# Patient Record
Sex: FEMALE | Race: WHITE | NOT HISPANIC OR LATINO | Employment: FULL TIME | URBAN - METROPOLITAN AREA
[De-identification: names, ages, dates, MRNs, and addresses within clinical notes are randomized per-mention and may not be internally consistent; named-entity substitution may affect disease eponyms.]

---

## 2017-10-10 ENCOUNTER — HISTORICAL (OUTPATIENT)
Dept: ADMINISTRATIVE | Facility: HOSPITAL | Age: 77
End: 2017-10-10

## 2018-05-21 ENCOUNTER — HISTORICAL (OUTPATIENT)
Dept: ADMINISTRATIVE | Facility: HOSPITAL | Age: 78
End: 2018-05-21

## 2018-05-23 ENCOUNTER — HISTORICAL (OUTPATIENT)
Dept: ADMINISTRATIVE | Facility: HOSPITAL | Age: 78
End: 2018-05-23

## 2018-07-12 ENCOUNTER — HISTORICAL (OUTPATIENT)
Dept: ADMINISTRATIVE | Facility: HOSPITAL | Age: 78
End: 2018-07-12

## 2019-01-04 ENCOUNTER — HISTORICAL (OUTPATIENT)
Dept: ADMINISTRATIVE | Facility: HOSPITAL | Age: 79
End: 2019-01-04

## 2019-02-25 ENCOUNTER — HISTORICAL (OUTPATIENT)
Dept: ADMINISTRATIVE | Facility: HOSPITAL | Age: 79
End: 2019-02-25

## 2019-02-25 LAB
ALBUMIN SERPL-MCNC: 4 GM/DL (ref 3.4–5)
ALBUMIN/GLOB SERPL: 1.48 {RATIO} (ref 1.5–2.5)
ALP SERPL-CCNC: 71 UNIT/L (ref 38–126)
ALT SERPL-CCNC: 20 UNIT/L (ref 7–52)
AST SERPL-CCNC: 27 UNIT/L (ref 15–37)
BILIRUB SERPL-MCNC: 0.9 MG/DL (ref 0.2–1)
BILIRUBIN DIRECT+TOT PNL SERPL-MCNC: 0.2 MG/DL (ref 0–0.5)
BILIRUBIN DIRECT+TOT PNL SERPL-MCNC: 0.7 MG/DL
BUN SERPL-MCNC: 17 MG/DL (ref 7–18)
CALCIUM SERPL-MCNC: 8.8 MG/DL (ref 8.5–10)
CHLORIDE SERPL-SCNC: 100 MMOL/L (ref 98–107)
CO2 SERPL-SCNC: 27 MMOL/L (ref 21–32)
CREAT SERPL-MCNC: 1.13 MG/DL (ref 0.6–1.3)
GLOBULIN SER-MCNC: 2.7 GM/DL (ref 1.2–3)
GLUCOSE SERPL-MCNC: 122 MG/DL (ref 74–106)
POTASSIUM SERPL-SCNC: 4.1 MMOL/L (ref 3.5–5.1)
PROT SERPL-MCNC: 6.7 GM/DL (ref 6.4–8.2)
SODIUM SERPL-SCNC: 137 MMOL/L (ref 136–145)

## 2019-06-06 ENCOUNTER — HISTORICAL (OUTPATIENT)
Dept: ADMINISTRATIVE | Facility: HOSPITAL | Age: 79
End: 2019-06-06

## 2019-06-19 ENCOUNTER — HISTORICAL (OUTPATIENT)
Dept: ADMINISTRATIVE | Facility: HOSPITAL | Age: 79
End: 2019-06-19

## 2019-06-19 LAB
ABS NEUT (OLG): 7.8 X10(3)/MCL (ref 2.1–9.2)
ALBUMIN SERPL-MCNC: 3.7 GM/DL (ref 3.4–5)
ALBUMIN/GLOB SERPL: 1.54 {RATIO} (ref 1.5–2.5)
ALP SERPL-CCNC: 82 UNIT/L (ref 38–126)
ALT SERPL-CCNC: 14 UNIT/L (ref 7–52)
AST SERPL-CCNC: 14 UNIT/L (ref 15–37)
BILIRUB SERPL-MCNC: 1.2 MG/DL (ref 0.2–1)
BILIRUBIN DIRECT+TOT PNL SERPL-MCNC: 0.3 MG/DL (ref 0–0.5)
BILIRUBIN DIRECT+TOT PNL SERPL-MCNC: 0.9 MG/DL
BUN SERPL-MCNC: 19 MG/DL (ref 7–18)
CALCIUM SERPL-MCNC: 8.7 MG/DL (ref 8.5–10)
CHLORIDE SERPL-SCNC: 100 MMOL/L (ref 98–107)
CHOLEST SERPL-MCNC: 170 MG/DL (ref 0–200)
CHOLEST/HDLC SERPL: 3 {RATIO}
CO2 SERPL-SCNC: 27 MMOL/L (ref 21–32)
CREAT SERPL-MCNC: 1.08 MG/DL (ref 0.6–1.3)
CREAT UR-MCNC: 300 MG/DL
ERYTHROCYTE [DISTWIDTH] IN BLOOD BY AUTOMATED COUNT: 13.1 % (ref 11.5–17)
EST. AVERAGE GLUCOSE BLD GHB EST-MCNC: 146 MG/DL
GLOBULIN SER-MCNC: 2.4 GM/DL (ref 1.2–3)
GLUCOSE SERPL-MCNC: 141 MG/DL (ref 74–106)
HBA1C MFR BLD: 6.7 % (ref 4.4–6.4)
HCT VFR BLD AUTO: 41.6 % (ref 37–47)
HDLC SERPL-MCNC: 57 MG/DL (ref 35–60)
HGB BLD-MCNC: 13.8 GM/DL (ref 12–16)
LDLC SERPL CALC-MCNC: 100 MG/DL (ref 0–129)
LYMPHOCYTES # BLD AUTO: 3.1 X10(3)/MCL (ref 0.6–3.4)
LYMPHOCYTES NFR BLD AUTO: 24.8 % (ref 13–40)
MCH RBC QN AUTO: 29.3 PG (ref 27–31.2)
MCHC RBC AUTO-ENTMCNC: 33 GM/DL (ref 32–36)
MCV RBC AUTO: 88 FL (ref 80–94)
MICROALBUMIN UR-MCNC: 30 MG/L
MICROALBUMIN/CREAT RATIO PNL UR: <30 MG/GM
MONOCYTES # BLD AUTO: 1.5 X10(3)/MCL (ref 0.1–1.3)
MONOCYTES NFR BLD AUTO: 12.2 % (ref 0.1–24)
NEUTROPHILS NFR BLD AUTO: 63 % (ref 47–80)
PLATELET # BLD AUTO: 249 X10(3)/MCL (ref 130–400)
PMV BLD AUTO: 8.5 FL (ref 9.4–12.4)
POTASSIUM SERPL-SCNC: 3.4 MMOL/L (ref 3.5–5.1)
PROT SERPL-MCNC: 6.1 GM/DL (ref 6.4–8.2)
RBC # BLD AUTO: 4.71 X10(6)/MCL (ref 4.2–5.4)
SODIUM SERPL-SCNC: 137 MMOL/L (ref 136–145)
T3FREE SERPL-MCNC: 2.23 PG/ML (ref 1.45–3.48)
T4 FREE SERPL-MCNC: 1.14 NG/DL (ref 0.76–1.46)
TRIGL SERPL-MCNC: 120 MG/DL (ref 30–150)
TSH SERPL-ACNC: 1.68 MIU/ML (ref 0.35–4.94)
VLDLC SERPL CALC-MCNC: 24 MG/DL
WBC # SPEC AUTO: 12.4 X10(3)/MCL (ref 4.5–11.5)

## 2019-10-21 ENCOUNTER — HISTORICAL (OUTPATIENT)
Dept: ADMINISTRATIVE | Facility: HOSPITAL | Age: 79
End: 2019-10-21

## 2019-10-21 LAB
ABS NEUT (OLG): 7.6 X10(3)/MCL (ref 2.1–9.2)
ALBUMIN SERPL-MCNC: 4.2 GM/DL (ref 3.4–5)
ALBUMIN/GLOB SERPL: 1.45 {RATIO} (ref 1.5–2.5)
ALP SERPL-CCNC: 74 UNIT/L (ref 38–126)
ALT SERPL-CCNC: 16 UNIT/L (ref 7–52)
APPEARANCE, UA: CLEAR
AST SERPL-CCNC: 19 UNIT/L (ref 15–37)
BACTERIA #/AREA URNS AUTO: NORMAL /HPF
BILIRUB SERPL-MCNC: 0.8 MG/DL (ref 0.2–1)
BILIRUB UR QL STRIP: NEGATIVE MG/DL
BILIRUBIN DIRECT+TOT PNL SERPL-MCNC: 0.2 MG/DL (ref 0–0.5)
BILIRUBIN DIRECT+TOT PNL SERPL-MCNC: 0.6 MG/DL
BUN SERPL-MCNC: 18 MG/DL (ref 7–18)
CALCIUM SERPL-MCNC: 9.4 MG/DL (ref 8.5–10)
CHLORIDE SERPL-SCNC: 99 MMOL/L (ref 98–107)
CHOLEST SERPL-MCNC: 183 MG/DL (ref 0–200)
CHOLEST/HDLC SERPL: 3 {RATIO}
CO2 SERPL-SCNC: 29 MMOL/L (ref 21–32)
COLOR UR: NORMAL
CREAT SERPL-MCNC: 1.04 MG/DL (ref 0.6–1.3)
CREAT UR-MCNC: 50 MG/DL
ERYTHROCYTE [DISTWIDTH] IN BLOOD BY AUTOMATED COUNT: 13.3 % (ref 11.5–17)
EST. AVERAGE GLUCOSE BLD GHB EST-MCNC: 140 MG/DL
GLOBULIN SER-MCNC: 2.9 GM/DL (ref 1.2–3)
GLUCOSE (UA): NEGATIVE MG/DL
GLUCOSE SERPL-MCNC: 126 MG/DL (ref 74–106)
HBA1C MFR BLD: 6.5 % (ref 4.4–6.4)
HCT VFR BLD AUTO: 42.6 % (ref 37–47)
HDLC SERPL-MCNC: 62 MG/DL (ref 35–60)
HGB BLD-MCNC: 13.9 GM/DL (ref 12–16)
HGB UR QL STRIP: NEGATIVE UNIT/L
KETONES UR QL STRIP: NEGATIVE MG/DL
LDLC SERPL CALC-MCNC: 100 MG/DL (ref 0–129)
LEUKOCYTE ESTERASE UR QL STRIP: NEGATIVE UNIT/L
LYMPHOCYTES # BLD AUTO: 2.5 X10(3)/MCL (ref 0.6–3.4)
LYMPHOCYTES NFR BLD AUTO: 22.1 % (ref 13–40)
MCH RBC QN AUTO: 28.7 PG (ref 27–31.2)
MCHC RBC AUTO-ENTMCNC: 33 GM/DL (ref 32–36)
MCV RBC AUTO: 88 FL (ref 80–94)
MICROALBUMIN UR-MCNC: 10 MG/L
MICROALBUMIN/CREAT RATIO PNL UR: <30 MG/GM
MONOCYTES # BLD AUTO: 1.2 X10(3)/MCL (ref 0.1–1.3)
MONOCYTES NFR BLD AUTO: 10.9 % (ref 0.1–24)
NEUTROPHILS NFR BLD AUTO: 67 % (ref 47–80)
NITRITE UR QL STRIP.AUTO: NEGATIVE
PH UR STRIP: 5 [PH]
PLATELET # BLD AUTO: 275 X10(3)/MCL (ref 130–400)
PMV BLD AUTO: 8.9 FL (ref 9.4–12.4)
POTASSIUM SERPL-SCNC: 3.7 MMOL/L (ref 3.5–5.1)
PROT SERPL-MCNC: 7.1 GM/DL (ref 6.4–8.2)
PROT UR QL STRIP: NEGATIVE MG/DL
RBC # BLD AUTO: 4.85 X10(6)/MCL (ref 4.2–5.4)
RBC #/AREA URNS HPF: NORMAL /HPF
SODIUM SERPL-SCNC: 140 MMOL/L (ref 136–145)
SP GR UR STRIP: 1.01
SQUAMOUS EPITHELIAL, UA: NORMAL /LPF
T3FREE SERPL-MCNC: 1.47 PG/ML (ref 1.45–3.48)
T4 FREE SERPL-MCNC: 1.35 NG/DL (ref 0.76–1.46)
TRIGL SERPL-MCNC: 100 MG/DL (ref 30–150)
TSH SERPL-ACNC: 0.25 MIU/ML (ref 0.35–4.94)
UROBILINOGEN UR STRIP-ACNC: 0.2 MG/DL
VLDLC SERPL CALC-MCNC: 20 MG/DL
WBC # SPEC AUTO: 11.3 X10(3)/MCL (ref 4.5–11.5)
WBC #/AREA URNS AUTO: NORMAL /[HPF]

## 2020-01-15 ENCOUNTER — HISTORICAL (OUTPATIENT)
Dept: ADMINISTRATIVE | Facility: HOSPITAL | Age: 80
End: 2020-01-15

## 2020-11-03 ENCOUNTER — HISTORICAL (OUTPATIENT)
Dept: ADMINISTRATIVE | Facility: HOSPITAL | Age: 80
End: 2020-11-03

## 2020-11-03 LAB
ALBUMIN SERPL-MCNC: 3.8 GM/DL (ref 3.4–5)
ALBUMIN/GLOB SERPL: 1.52 {RATIO} (ref 1.5–2.5)
ALP SERPL-CCNC: 66 UNIT/L (ref 38–126)
ALT SERPL-CCNC: 20 UNIT/L (ref 7–52)
AST SERPL-CCNC: 24 UNIT/L (ref 15–37)
BILIRUB SERPL-MCNC: 1 MG/DL (ref 0.2–1)
BILIRUBIN DIRECT+TOT PNL SERPL-MCNC: 0.2 MG/DL (ref 0–0.5)
BILIRUBIN DIRECT+TOT PNL SERPL-MCNC: 0.8 MG/DL
BUN SERPL-MCNC: 26 MG/DL (ref 7–18)
CALCIUM SERPL-MCNC: 9.4 MG/DL (ref 8.5–10)
CHLORIDE SERPL-SCNC: 100 MMOL/L (ref 98–107)
CHOLEST SERPL-MCNC: 184 MG/DL (ref 0–200)
CHOLEST/HDLC SERPL: 3.3 {RATIO}
CO2 SERPL-SCNC: 26 MMOL/L (ref 21–32)
CREAT SERPL-MCNC: 1.14 MG/DL (ref 0.6–1.3)
CREAT UR-MCNC: 300 MG/DL
EST. AVERAGE GLUCOSE BLD GHB EST-MCNC: 146 MG/DL
GLOBULIN SER-MCNC: 2.5 GM/DL (ref 1.2–3)
GLUCOSE SERPL-MCNC: 125 MG/DL (ref 74–106)
HBA1C MFR BLD: 6.7 % (ref 4.4–6.4)
HDLC SERPL-MCNC: 55 MG/DL (ref 35–60)
LDLC SERPL CALC-MCNC: 113 MG/DL (ref 0–129)
MICROALBUMIN UR-MCNC: 80 MG/L
MICROALBUMIN/CREAT RATIO PNL UR: ABNORMAL MG/GM
POTASSIUM SERPL-SCNC: 4.5 MMOL/L (ref 3.5–5.1)
PROT SERPL-MCNC: 6.3 GM/DL (ref 6.4–8.2)
SODIUM SERPL-SCNC: 140 MMOL/L (ref 136–145)
TRIGL SERPL-MCNC: 155 MG/DL (ref 30–150)
VLDLC SERPL CALC-MCNC: 31 MG/DL

## 2020-11-09 ENCOUNTER — HISTORICAL (OUTPATIENT)
Dept: ADMINISTRATIVE | Facility: HOSPITAL | Age: 80
End: 2020-11-09

## 2020-11-09 LAB
DEPRECATED CALCIDIOL+CALCIFEROL SERPL-MC: 39.3 NG/ML (ref 30–80)
T3FREE SERPL-MCNC: 2.37 PG/ML (ref 1.45–3.48)
T4 FREE SERPL-MCNC: 1.01 NG/DL (ref 0.76–1.46)
TSH SERPL-ACNC: 1.21 MIU/ML (ref 0.35–4.94)

## 2021-05-11 ENCOUNTER — HISTORICAL (OUTPATIENT)
Dept: ADMINISTRATIVE | Facility: HOSPITAL | Age: 81
End: 2021-05-11

## 2021-05-11 LAB
ABS NEUT (OLG): 6.1 X10(3)/MCL (ref 2.1–9.2)
ALBUMIN SERPL-MCNC: 3.9 GM/DL (ref 3.4–5)
ALBUMIN/GLOB SERPL: 1.77 {RATIO} (ref 1.5–2.5)
ALP SERPL-CCNC: 56 UNIT/L (ref 38–126)
ALT SERPL-CCNC: 19 UNIT/L (ref 7–52)
AST SERPL-CCNC: 25 UNIT/L (ref 15–37)
BILIRUB SERPL-MCNC: 0.9 MG/DL (ref 0.2–1)
BILIRUBIN DIRECT+TOT PNL SERPL-MCNC: 0.2 MG/DL (ref 0–0.5)
BILIRUBIN DIRECT+TOT PNL SERPL-MCNC: 0.7 MG/DL
BUN SERPL-MCNC: 19 MG/DL (ref 7–18)
CALCIUM SERPL-MCNC: 9.7 MG/DL (ref 8.5–10)
CHLORIDE SERPL-SCNC: 98 MMOL/L (ref 98–107)
CHOLEST SERPL-MCNC: 186 MG/DL (ref 0–200)
CHOLEST/HDLC SERPL: 3.2 {RATIO}
CO2 SERPL-SCNC: 30 MMOL/L (ref 21–32)
CREAT SERPL-MCNC: 0.96 MG/DL (ref 0.6–1.3)
DEPRECATED CALCIDIOL+CALCIFEROL SERPL-MC: 37.8 NG/ML (ref 30–80)
ERYTHROCYTE [DISTWIDTH] IN BLOOD BY AUTOMATED COUNT: 14 % (ref 11.5–17)
EST CREAT CLEARANCE SER (OHS): 91.19 ML/MIN
GLOBULIN SER-MCNC: 2.2 GM/DL (ref 1.2–3)
GLUCOSE SERPL-MCNC: 114 MG/DL (ref 74–106)
HCT VFR BLD AUTO: 42.9 % (ref 37–47)
HDLC SERPL-MCNC: 58 MG/DL (ref 35–60)
HGB BLD-MCNC: 13.8 GM/DL (ref 12–16)
LDLC SERPL CALC-MCNC: 102 MG/DL (ref 0–129)
LYMPHOCYTES # BLD AUTO: 2.9 X10(3)/MCL (ref 0.6–3.4)
LYMPHOCYTES NFR BLD AUTO: 28.3 % (ref 13–40)
MCH RBC QN AUTO: 29.1 PG (ref 27–31.2)
MCHC RBC AUTO-ENTMCNC: 32 GM/DL (ref 32–36)
MCV RBC AUTO: 90 FL (ref 80–94)
MONOCYTES # BLD AUTO: 1.3 X10(3)/MCL (ref 0.1–1.3)
MONOCYTES NFR BLD AUTO: 12.7 % (ref 0.1–24)
NEUTROPHILS NFR BLD AUTO: 59 % (ref 47–80)
PLATELET # BLD AUTO: 260 X10(3)/MCL (ref 130–400)
PMV BLD AUTO: 8.9 FL (ref 9.4–12.4)
POTASSIUM SERPL-SCNC: 4.1 MMOL/L (ref 3.5–5.1)
PROT SERPL-MCNC: 6.1 GM/DL (ref 6.4–8.2)
RBC # BLD AUTO: 4.75 X10(6)/MCL (ref 4.2–5.4)
SODIUM SERPL-SCNC: 138 MMOL/L (ref 136–145)
T3FREE SERPL-MCNC: 2.03 PG/ML (ref 1.45–3.48)
T4 FREE SERPL-MCNC: 1.38 NG/DL (ref 0.76–1.46)
TRIGL SERPL-MCNC: 240 MG/DL (ref 30–150)
TSH SERPL-ACNC: 0.43 MIU/ML (ref 0.35–4.94)
VLDLC SERPL CALC-MCNC: 48 MG/DL
WBC # SPEC AUTO: 10.3 X10(3)/MCL (ref 4.5–11.5)

## 2021-06-16 ENCOUNTER — HISTORICAL (OUTPATIENT)
Dept: ADMINISTRATIVE | Facility: HOSPITAL | Age: 81
End: 2021-06-16

## 2021-06-16 LAB
CREAT UR-MCNC: 300 MG/DL
EST. AVERAGE GLUCOSE BLD GHB EST-MCNC: 137 MG/DL
HBA1C MFR BLD: 6.4 % (ref 4.4–6.4)
MICROALBUMIN UR-MCNC: 150 MG/L
MICROALBUMIN/CREAT RATIO PNL UR: ABNORMAL MG/GM

## 2021-07-27 ENCOUNTER — HISTORICAL (OUTPATIENT)
Dept: ADMINISTRATIVE | Facility: HOSPITAL | Age: 81
End: 2021-07-27

## 2022-04-09 ENCOUNTER — HISTORICAL (OUTPATIENT)
Dept: ADMINISTRATIVE | Facility: HOSPITAL | Age: 82
End: 2022-04-09

## 2022-04-26 VITALS
HEIGHT: 61 IN | DIASTOLIC BLOOD PRESSURE: 88 MMHG | WEIGHT: 228.63 LBS | SYSTOLIC BLOOD PRESSURE: 137 MMHG | OXYGEN SATURATION: 98 % | BODY MASS INDEX: 43.16 KG/M2

## 2022-04-30 NOTE — OP NOTE
DATE OF SURGERY:    10-10-17    SURGEON:  Amanda Bearden MD        PREOPERATIVE DIAGNOSIS:  Nucleosclerotic cataract of the right eye.    POST OPERATIVE DIAGNOSIS:  Nucleosclerotic cataract of the right eye.    PROCEDURE:  Cataract extraction with intraocular lens implantation of the right eye.    ANESTHESIA:  Local plus MAC.    COMPLICATIONS:  None.    ESTIMATED BLOOD LOSS:  None.      After discussion of risks, benefits and alternative with the patient and family, informed consent was obtained by the patient in clinic including but not limited to bleeding, infection, decreased vision, loss of the eye, need for subsequent surgery.  The patient understands and wishes to proceed.    PROCEDURE IN DETAIL:    The patient was brought into the operating room and laid in supine manner.  After anesthesia was undertaken without complication, the right eye and periorbital region were prepped and draped in usual manner for intraocular surgery while a speculum was placed in the operative eye for adequate exposure. A paracentesis incision was made at approximately 11 o'clock with a clear cornea at the limbus. Preservative free Lidocaine and epinephrine was injected into the anterior chamber followed by viscoelastic.  A triplanar cataract wound was then created approximately 7 o'clock through clear cornea at the limbus.  Curvilinear capsulorrhexis was created without complication.     BSS sonic cannula was used to hydrodissect the lens.  The lens was found to move freely within the capsular bag.  Lens was then removed in divide and conquer technique.  Remaining cortical material was removed with I&A handpiece.  20.5 diopter ZCBOO lens was then implanted into the capsular bag. The remaining viscoelastic was then removed with the irrigation aspiration handpiece. The wounds were hydrated and postop injections given..  The patient tolerated the procedure well.  Eyelid speculum was removed followed by pressure patch and shield.   The patient was turned over to anesthesia in stable condition.

## 2022-05-02 NOTE — HISTORICAL [facility] CERNER
This is a historical note converted from Brijesh. Formatting and pictures may have been removed.  Please reference Brijesh for original formatting and attached multimedia. Chief Complaint  CPX FASTING  History of Present Illness  Patient presents for a wellness exam.  Granddaughter moved in with her since   in 2020. Daughter, Ita,  in 2020.  ?  ORTHO: Shane  OPHTH : Brandy  ENT : Bearden  GYN : Chantel  GI : Person, colonoscopy 2015 - polyps,? didnt have repeat in 2019 due to husbands illness. Also had EGD, esophageal ulcers.  Review of Systems  General:??????????????? Patient reports energy level is good.???Denies fever,chills, night sweats, fatigue or weakness.  Integument:???????? Denies any nevus changes, rashes, urticaria, ?or sores.? Also denies itching or areas of numbness.  HEENT:?????????????????? Denies vision changes or eye pain.? No sore throat, ear pain, sinus pressure or discharge.  Cardiovascular:??? Denies chest pain, palpitations, dyspnea on exertion, orthopnea.  Respiratory:???????? No cough, wheezing, shortness of breath, or sputum.  GI:????????????????????????? Denies nausea, emesis, constipation, diarrhea, melena, hematochezia or abdominal pain  :??????????????????????? No frequency, urgency, hematuria, discharge, or incontinence  MS:?????????????????????severe OA pain in Right knee. Tried Celebrex, Mobic and Tramadol, 2 per day is not helping. ???Appointment with Dr Chaudhry scheduled for .  Neuro:????????????????? No headaches, numbness in extremities, tingling, dizziness, or weakness  Psych:???????????????taking 1 Valium at night and 1 during the day as needed. Never started sertraline that was Rx.??? Denies suicidal or homicidal ideations, or irritability  Endo:??????????????????? Denies polyuria, polydipsia, polyphagia  Heme:????????????????? No abnormal bleeding or bruising. No lymph node enlargement or pain.  Physical Exam  Vitals &  Measurements  HR:?69(Peripheral)? BP:?137/83?  HT:?155.00?cm? HT:?155?cm? WT:?119.600?kg? WT:?119.6?kg? BMI:?49.78?  General :?????Well-developed, morbidly obese white female in no apparent distress, alert and oriented ?4  Integument :????? Skin is intact with no erythema.? No pustules or vesicles.? No rash or scale. No Lymphadenopathy.? No urticaria.? No abnormal nevi  HEENT :?????ZAKIYA, EOMI ; TMs and EACs clear, normal turbinates with no erythema, normal mucosa, no sinus tenderness; no erythema or exudate of mouth or pharynx  Neck :?????Supple, no lymphadenopathy, no thyromegaly, no bruits, no jugular venous distention  Cardiovascular :?????? Regular rhythm and rate, no murmurs, radial and dorsal pedal pulses 2+ bilaterally  Respiratory :??????Lungs clear to auscultation bilaterally, no wheezes, no crackles, no rhonchi.? Good air movement  Abdomen :?????? ?NABS, soft, nontender, no hepatosplenomegaly, no masses, no guarding or rebound??????????????????????????  MS :??????? Pain with range of motion of?right knee, to lesser degree other joints., ?1+ pretibial edema bilaterally  Neuro :? ?????? CN II-XII intact, reflexes 2+ throughout, no motor sensory deficits, negative?cerebellar? tests  Psych :??????Normal affect, patient calm, good historian, patient answers questions??? appropriately.???????  Heme :?????? No bruising or petechiae?  Assessment/Plan  1.?Wellness examination?Z00.00  ?Age-appropriate wellness topics discussed.? Refilled her chronic medications for 6 months except as noted below.? We will check fasting labs today and contact her with the results  2.?Osteoarthritis of right knee?M17.11  ?We will increase Tramadol up to every 6 hours.  3.?DM - Diabetes mellitus?E11.9  4.?Anxiety?F41.9  ?She still has mild to moderate?anxiety.? Prescribed buspirone. ?She will contact us in 4 to 6 weeks if her mood is not?at baseline, sooner she has any questions or concerns.  5.?High  cholesterol?E78.00  6.?Hypertension?I10  ?Blood pressures well controlled  7.?Hypothyroidism?E03.9  8.?Obesity?E66.9  ?Discussed long-term adverse consequences of obesity  9.?Bronchospasm?J98.01  ?Stable. ?Controlled with?as needed?albuterol?HFA.  10.?Vitamin D deficiency?E55.9  Referrals  Clinic Follow up, *Est. 21 3:00:00 CDT, Order for future visit, Anxiety, [facility]  Clinic Follow up, *Est. 21 10:30:00 CDT, Order for future visit, Hypertension, [facility]   Problem List/Past Medical History  Ongoing  Anxiety  Arthritis  Bronchospasm  Cataracts  Cough  Diverticulitis  DM - Diabetes mellitus  Fibromyalgia  Hearing loss  High cholesterol  Hypertension  Hypothyroidism  Obesity  Osteoarthritis of AC (acromioclavicular) joint  Osteoarthritis of right shoulder  Osteoporosis  Partial tear of right rotator cuff  Peripheral edema  Sciatica  sleep apnea  Strain of tendon of right rotator cuff  Tear of right supraspinatus tendon  Historical  Pneumonia  Sinusitis  Wears glasses  Procedure/Surgical History  26343 - RT CATARACT W/IOL 1 STA PHACO (Right) (10/10/2017)  Extracapsular cataract removal with insertion of intraocular lens prosthesis (1 stage procedure), manual or mechanical technique (eg, irrigation and aspiration or phacoemulsification) (10/10/2017)  Replacement of Right Lens with Synthetic Substitute, Percutaneous Approach (10/10/2017)  Hernia repair ()  Excision of lesion of other soft tissue (2013)  Excision, tumor, soft tissue of shoulder area, subcutaneous; 3 cm or greater. (2013)  Tonsillectomy (1948)   delivery  Cholecystectomy  Excision of cyst of ovary   Medications  Astelin 137 mcg/inh nasal spray, 2 spray(s), Nasal, BID, 1 refills  busPIRone 15 mg oral tablet, See Instructions, 1 refills  Caltrate 600 with D  clobetasol 0.05% topical solution, 1 annetta, TOP, BID  DULoxetine 30 mg oral delayed release capsule, 30 mg= 1 cap(s), Oral, Daily, 5 refills  fluocinonide 0.05%  topical solution, TOP, TID  furosemide 20 mg oral tablet, See Instructions, 1 refills  levothyroxine 125 mcg (0.125 mg) oral tablet, See Instructions, 1 refills  metformin 500 mg oral tablet, See Instructions, 1 refills  omeprazole 40 mg oral DR capsule, 40 mg= 1 cap(s), Oral, Daily, 5 refills  prednisONE 5 mg oral tablet, See Instructions  rosuvastatin 10 mg oral tablet, See Instructions, 1 refills  traMADol 50 mg oral tablet, 50 mg= 1 tab(s), Oral, BID, PRN  Valium 5 mg oral tablet, See Instructions, PRN  valsartan 320 mg oral tablet, 320 mg= 1 tab(s), Oral, Daily, 1 refills  Ventolin HFA 90 mcg/inh inhalation aerosol, 2 puff(s), INH, q4hr, PRN, 1 refills  Allergies  sulfa drugs  Social History  Abuse/Neglect  No, 05/11/2021  No, 11/03/2020  No, 06/30/2020  No, 04/08/2020  No, 01/15/2020  No, 10/21/2019  No, 06/19/2019  Alcohol  Current, Wine, 1-2 times per week, 12/21/2018  Employment/School  Retired, Work/School description: education level: college., 08/27/2013  Home/Environment  Lives with Spouse. Living situation: Home/Independent., 08/27/2013  Substance Use - Denies Substance Abuse, 08/27/2013  Never, 12/21/2018  Tobacco - Denies Tobacco Use, 08/27/2013  Never (less than 100 in lifetime), No, 05/11/2021  Never (less than 100 in lifetime), No, 11/03/2020  Never (less than 100 in lifetime), No, 06/30/2020  Never (less than 100 in lifetime), No, 04/08/2020  Never (less than 100 in lifetime), N/A, 01/15/2020  Never (less than 100 in lifetime), N/A, 10/21/2019  Never (less than 100 in lifetime), N/A, 06/19/2019  Never (less than 100 in lifetime), N/A, 06/06/2019  Never (less than 100 in lifetime), N/A, 03/01/2019  Never (less than 100 in lifetime), N/A, 02/25/2019  Family History  Acute myocardial infarction.: Mother.  Hypercholesterolaemia: Brother.  Hypertension.: Mother and Brother.  Liver cancer: Father and Brother.  Stroke: Brother (Dx at 45).  Sister: History is negative  Immunizations  Vaccine Date Status  Comments   influenza virus vaccine, inactivated 11/03/2020 Given    pneumococcal 23-polyvalent vaccine 04/08/2020 Given    influenza virus vaccine, inactivated 10/21/2019 Given    influenza virus vaccine, inactivated 12/05/2018 Given    tetanus/diphth/pertuss (Tdap) adult/adol 09/13/2017 Recorded    influenza virus vaccine, inactivated 09/13/2017 Recorded [2/16/2018] HIGH DOSE   pneumococcal 13-valent conjugate vaccine 10/06/2016 [facility]   anti-thymocyte globulin (rabbit) 10/06/2016 Recorded [facility]   Health Maintenance  Health Maintenance  ???Pending?(in the next year)  ??? ??OverDue  ??? ? ? ?Depression Screening due??10/12/19??and every 1??year(s)  ??? ? ? ?Diabetes Maintenance-Eye Exam due??01/07/20??and every 1??year(s)  ??? ? ? ?Advance Directive due??01/02/21??and every 1??year(s)  ??? ? ? ?Cognitive Screening due??01/02/21??and every 1??year(s)  ??? ? ? ?Fall Risk Assessment due??01/02/21??and every 1??year(s)  ??? ? ? ?Functional Assessment due??01/02/21??and every 1??year(s)  ??? ??Due?  ??? ? ? ?ADL Screening due??05/22/21??and every 1??year(s)  ??? ? ? ?Aspirin Therapy for CVD Prevention due??05/22/21??and every 1??year(s)  ??? ? ? ?Bone Density Screening due??05/22/21??Variable frequency  ??? ? ? ?Diabetes Maintenance-Foot Exam due??05/22/21??Unknown Frequency  ??? ? ? ?Hypertension Management-Education due??05/22/21??and every 1??year(s)  ??? ? ? ?Zoster Vaccine due??05/22/21??Unknown Frequency  ??? ??Due In Future?  ??? ? ? ?Influenza Vaccine not due until??10/01/21??and every 1??day(s)  ??? ? ? ?Diabetes Maintenance-HgbA1c not due until??11/03/21??and every 1??year(s)  ??? ? ? ?Obesity Screening not due until??01/01/22??and every 1??year(s)  ??? ? ? ?Blood Pressure Screening not due until??05/11/22??and every 1??year(s)  ??? ? ? ?Body Mass Index Check not due until??05/11/22??and every 1??year(s)  ??? ? ? ?Hypertension Management-BMP not due until??05/11/22??and every 1??year(s)  ??? ? ?  ?Hypertension Management-Blood Pressure not due until??05/11/22??and every 1??year(s)  ??? ? ? ?Medicare Annual Wellness Exam not due until??05/11/22??and every 1??year(s)  ??? ? ? ?Diabetes Maintenance-Fasting Lipid Profile not due until??05/11/22??and every 1??year(s)  ??? ? ? ?Diabetes Maintenance-Serum Creatinine not due until??05/11/22??and every 1??year(s)  ???Satisfied?(in the past 1 year)  ??? ??Satisfied?  ??? ? ? ?Blood Pressure Screening on??05/11/21.??Satisfied by Tammi Coronado CMA S.  ??? ? ? ?Body Mass Index Check on??05/11/21.??Satisfied by Tammi Coronado CMA S.  ??? ? ? ?Diabetes Maintenance-Fasting Lipid Profile on??05/11/21.??Satisfied by Suresh Rodriguez  ??? ? ? ?Diabetes Maintenance-HgbA1c on??11/03/20.??Satisfied by Cheyenne Mendez  ??? ? ? ?Diabetes Maintenance-Microalbumin on??11/03/20.??Satisfied by Tanya Lai  ??? ? ? ?Diabetes Maintenance-Serum Creatinine on??05/11/21.??Satisfied by Suresh Rodriguez  ??? ? ? ?Diabetes Screening on??05/11/21.??Satisfied by Suresh Rodriguez  ??? ? ? ?Hypertension Management-BMP on??05/11/21.??Satisfied by Suresh Rodriguez  ??? ? ? ?Hypertension Management-Blood Pressure on??05/11/21.??Satisfied by Tammi Coronado CMA S.  ??? ? ? ?Influenza Vaccine on??11/03/20.??Satisfied by Tammi Coronado CMA S.  ??? ? ? ?Lipid Screening on??05/11/21.??Satisfied by Suresh Rodriguez  ??? ? ? ?Medicare Annual Wellness Exam on??05/11/21.??Satisfied by Cosme Estes ?JOHN PARKER  ??? ? ? ?Obesity Screening on??05/11/21.??Satisfied by Tammi Coronado CMA  ?

## 2022-05-02 NOTE — HISTORICAL [facility] CERNER
This is a historical note converted from Brijesh. Formatting and pictures may have been removed.  Please reference Brijesh for original formatting and attached multimedia. Chief Complaint  PRE OP SX CLEARANCE  History of Present Illness  Patient has been seen by Dr. Morris.? She has a right full-thickness supraspinatus tear and right AC joint OA.?? Plan in for surgery 11-21-19.  No cardiologist.  Does not check glucose at home. Using albuterol at bedtime most days. No lifetime nicotine but second hand exposure.  ?  GI : Person.  GYN : Chantel  Review of Systems  General:??????????????? Patient reports energy level is good.??Denies fever,chills, night sweats, fatigue or weakness.  HEENT:?????????????????? Denies vision changes or eye pain.? No sore throat, ear pain, sinus pressure or discharge.  Cardiovascular:??? Denies chest pain, palpitations, dyspnea on exertion, orthopnea.  Respiratory:???????? No cough, wheezing, shortness of breath, or sputum.  GI:????????????????????????? Denies nausea, emesis, constipation, diarrhea, melena, hematochezia or abdominal pain  :??????????????????????? No frequency, urgency, hematuria, discharge, or incontinence  MS:?????????????????????? See HPI  Neuro:??????????????See HPI  Psych:?????????????????? Denies anxiety, depression, suicidal or homicidal ideations, or irritability  Endo:??????????????????? Denies polyuria, polydipsia, polyphagia  Heme:????????????????? No abnormal bleeding or bruising. No lymph node enlargement or pain.  Physical Exam  Vitals & Measurements  HR:?81(Peripheral)? BP:?128/72?  HT:?155?cm? WT:?120.2?kg? BMI:?50.03?  General :?????Well-developed and? nourished, no apparent distress, alert and oriented ?4  Neck :?????Supple, no lymphadenopathy, no thyromegaly, no bruits, no jugular venous distention  Cardiovascular :?????? Regular rhythm and rate, no murmurs, radial and dorsal pedal pulses 2+ bilaterally  Respiratory :??????Lungs clear to auscultation  bilaterally, no wheezes, no crackles, no rhonchi.? Good air movement  Abdomen :?????? ?NABS, soft, nontender, no hepatosplenomegaly, no masses, no guarding or rebound????????????????????????  MS :??????? Tender range of motion of upper extremities  Neuro :? ?Grossly intact  Heme :?????? No bruising or petechiae?  Back:??????? no CVAT  ?   EKG: Normal  Assessment/Plan  1.?Tear of right supraspinatus tendon?M75.101  Refilled tramadol.? Cleared for surgery  2.?Osteoarthritis of AC (acromioclavicular) joint?M19.019  3.?DM - Diabetes mellitus?E11.9  We will check A1c.? Refilled metformin  4.?Bronchospasm?J98.01  ?Mild  5.?Hypertension?I10  Stable.? Refilled?axillary  6.?Hypothyroidism?E03.9  We will check TSH, FT3, and FT4.? Refill levothyroxine.  7.?High cholesterol?E78.00  We will check CMP and lipids.? Refilled Crestor.  8.?Allergic rhinitis?J30.9  ?Prescribed Astelin nasal spray.  9.?Immunization due?Z23  ?Flu vaccine administered  Referrals  Clinic Follow-up PRN, 10/21/19 11:16:00 CDT, [facility], Future Order   Problem List/Past Medical History  Ongoing  Anxiety  Arthritis  Bronchospasm  Cataracts  Cough  Diverticulitis  DM - Diabetes mellitus  Fibromyalgia  Hearing loss  High cholesterol  Hypertension  Hypothyroidism  Obesity  Osteoarthritis of AC (acromioclavicular) joint  Osteoarthritis of right shoulder  Osteoporosis  Partial tear of right rotator cuff  Peripheral edema  Sinusitis  sleep apnea  Strain of tendon of right rotator cuff  Tear of right supraspinatus tendon  Wears glasses  Historical  Pneumonia  Procedure/Surgical History  23885 - RT CATARACT W/IOL 1 STA PHACO (Right) (10/10/2017)  Extracapsular cataract removal with insertion of intraocular lens prosthesis (1 stage procedure), manual or mechanical technique (eg, irrigation and aspiration or phacoemulsification) (10/10/2017)  Replacement of Right Lens with Synthetic Substitute, Percutaneous Approach (10/10/2017)  Hernia repair (2014)  Excision  of lesion of other soft tissue (2013)  Excision, tumor, soft tissue of shoulder area, subcutaneous; 3 cm or greater. (2013)  Tonsillectomy (1948)   delivery  Cholecystectomy  Excision of cyst of ovary   Medications  Astelin 137 mcg/inh nasal spray, 2 spray(s), Nasal, BID, 1 refills  Caltrate 600 with D  Carafate 1 gram tablet, 1 gm= 1 tab(s), Oral, QID  Cipro 500 mg oral tablet, 500 mg= 1 tab(s), Oral, q12hr  Crestor 10 mg oral tablet, 10 mg= 1 tab(s), Oral, Daily, 1 refills  DIAZepam 5 mg oral tablet, 5 mg= 1 tab(s), Oral, qPM, PRN, 1 refills  Exforge 10 mg-320 mg oral tablet, 1 tab(s), Oral, Daily, 1 refills  fluocinonide 0.05% topical solution, 1 annetta, TOP, TID  Lasix 20 mg oral tablet, 20 mg= 1 tab(s), Oral, Daily, 1 refills  levothyroxine 112 mcg (0.112 mg) oral tablet, 112 mcg= 1 tab(s), Oral, Daily, 1 refills  metformin 500 mg oral tablet, 500 mg= 1 tab(s), Oral, q24hr, 1 refills  metronidazole 500 mg oral tablet, 500 mg= 1 tab(s), Oral, q8hr  MVI with Minerals (Adult Tab), Oral, Daily  omeprazole 40 mg oral DR capsule, 40 mg= 1 cap(s), Oral, Daily  prednisONE 5 mg oral tablet, 5 mg= 1 tab(s), Oral, BID, 1 refills  Promethazine PLO Gel 50mg/ml, See Instructions  traMADol 50 mg oral tablet, 50 mg= 1 tab(s), Oral, BID, PRN  Ventolin HFA 90 mcg/inh inhalation aerosol, 2 puff(s), INH, q4hr, PRN, 1 refills  Allergies  sulfa drugs  Social History  Abuse/Neglect  No, 10/21/2019  No, 2019  Alcohol  Current, Wine, 1-2 times per week, 2018  Employment/School  Retired, Work/School description: education level: college., 2013  Home/Environment  Lives with Spouse. Living situation: Home/Independent., 2013  Substance Use - Denies Substance Abuse, 2013  Never, 2018  Tobacco - Denies Tobacco Use, 2013  Never (less than 100 in lifetime), N/A, 10/21/2019  Never (less than 100 in lifetime), N/A, 2019  Never (less than 100 in lifetime), N/A, 2019  Never  (less than 100 in lifetime), N/A, 03/01/2019  Never (less than 100 in lifetime), N/A, 02/25/2019  Family History  Acute myocardial infarction.: Mother.  Hypercholesterolaemia: Brother.  Hypertension.: Mother and Brother.  Liver cancer: Father and Brother.  Stroke: Brother (Dx at 45).  Sister: History is negative  Immunizations  Vaccine Date Status Comments   influenza virus vaccine, inactivated 10/21/2019 Given    influenza virus vaccine, inactivated 12/05/2018 Given    tetanus/diphth/pertuss (Tdap) adult/adol 09/13/2017 Recorded    influenza virus vaccine, inactivated 09/13/2017 Recorded [2/16/2018] HIGH DOSE   pneumococcal 13-valent conjugate vaccine 10/06/2016 Recorded [facility]   anti-thymocyte globulin (rabbit) 10/06/2016 Recorded [facility]   Health Maintenance  Health Maintenance  ???Pending?(in the next year)  ??? ??OverDue  ??? ? ? ?Pneumococcal Vaccine due??and every?  ??? ? ? ?Advance Directive due??01/01/19??and every 1??year(s)  ??? ? ? ?Cognitive Screening due??01/01/19??and every 1??year(s)  ??? ? ? ?Fall Risk Assessment due??01/01/19??and every 1??year(s)  ??? ? ? ?Functional Assessment due??01/01/19??and every 1??year(s)  ??? ? ? ?Geriatric Depression Screening due??01/01/19??and every 1??year(s)  ??? ??Due?  ??? ? ? ?ADL Screening due??10/25/19??and every 1??year(s)  ??? ? ? ?Aspirin Therapy for CVD Prevention due??10/25/19??and every 1??year(s)  ??? ? ? ?Bone Density Screening due??10/25/19??Variable frequency  ??? ? ? ?Diabetes Maintenance-Foot Exam due??10/25/19??and every?  ??? ? ? ?Hypertension Management-Education due??10/25/19??and every 1??year(s)  ??? ? ? ?Zoster Vaccine due??10/25/19??and every 100??year(s)  ??? ??Due In Future?  ??? ? ? ?Obesity Screening not due until??01/01/20??and every 1??year(s)  ??? ? ? ?Diabetes Maintenance-Eye Exam not due until??01/07/20??and every 1??year(s)  ??? ? ? ?Diabetes Maintenance-HgbA1c not due until??10/20/20??and every 1??year(s)  ??? ? ? ?Diabetes  Maintenance-Fasting Lipid Profile not due until??10/20/20??and every 1??year(s)  ??? ? ? ?Hypertension Management-Blood Pressure not due until??10/20/20??and every 1??year(s)  ??? ? ? ?Hypertension Management-BMP not due until??10/20/20??and every 1??year(s)  ??? ? ? ?Diabetes Maintenance-Serum Creatinine not due until??10/21/20??and every 1??year(s)  ??? ? ? ?Diabetes Maintenance-Urine Dipstick not due until??10/21/20??and every 1??year(s)  ???Satisfied?(in the past 1 year)  ??? ??Satisfied?  ??? ? ? ?Blood Pressure Screening on??10/21/19.??Satisfied by Karen Chin LPN  ??? ? ? ?Body Mass Index Check on??10/21/19.??Satisfied by Karen Chin LPN  ??? ? ? ?Breast Cancer Screening on??06/26/19.??Satisfied by Nicole Moise  ??? ? ? ?Diabetes Maintenance-Serum Creatinine on??10/21/19.??Satisfied by Cheyenne Mendez  ??? ? ? ?Diabetes Screening on??10/21/19.??Satisfied by Cheyenne Mendez  ??? ? ? ?Fall Risk Assessment on??12/21/18.??Satisfied by Jj Hawthorne  ??? ? ? ?Hypertension Management-BMP on??10/21/19.??Satisfied by Tanya Lai  ??? ? ? ?Influenza Vaccine on??10/21/19.??Satisfied by Karen Chin LPN  ??? ? ? ?Lipid Screening on??10/21/19.??Satisfied by Cheyenne Mendez  ??? ? ? ?Obesity Screening on??10/21/19.??Satisfied by Karen Chin LPN  ?

## 2022-05-02 NOTE — HISTORICAL [facility] CERNER
This is a historical note converted from Brijesh. Formatting and pictures may have been removed.  Please reference Brijesh for original formatting and attached multimedia. Chief Complaint  MED RF  History of Present Illness  Patient has had?sinus congestion?times 5 days.? Her nasal congestion is?watery?and slightly thickening.? She continues to use her albuterol inhaler twice a day.? Positive hoarseness.  ?  Patient had a change in her pharmacy and needs a?refill on her medications. ?She denies any?side effects?or problems taking the medications.  ?  Her Lasix?was changed?from 40 mg/week to 20 mg daily back in December.? She is due for a lab follow-up.  Review of Systems  Constitutional:?nofever,noweakness?  Eye:?no?eye redness, drainage, or pain  HENT:?nosore?throat pain,+postnasal drainage,+mucus production,noear pain  Respiratory:?+cough,nowheezing,nosputum,?noshortness of breath  Cardiovascular:?no chest pain, no palpitations  Gastrointestinal:?no nausea, vomiting, or diarrhea. No abdominal pain  Musculoskeletal:?no muscle or joint pain, no joint swelling  Integumentary:?no skin rash or abnormal lesion  ?  Physical Exam  Vitals & Measurements  HR:?68(Peripheral)? BP:?132/72?  HT:?155?cm? WT:?120.6?kg? BMI:?50.2?  General:???Alert, Oriented x 4, No apparent distress,?No?cough, stable on room air, voice?normal  Ears:????TM?s?with eythema on Right,??EAC?s??clear??  Eyes:????PERRLA, EOMI, Conjunctiva??normal??  Nose:???mucosa??normal, turbinates?normal,no sinus tenderness, nasal septum??normal??  Oral/Pharynx:???normal tonsils,normal?buccal mucosa,normalpharynx  Neck:????supple,no??adenopathy,normal sized thyroid,no?bruits, no JVD  Lungs:??clear throughout, good air movement,?nowheezes,no??rhonchi,no??crackles?  CV:???????RRR?,no murmur, pulses equal throughout  Abd:???? soft,non-tender, no hepato-spleenomegaly,NABS; no rebound, guarding, or masses  ?  Assessment/Plan  1.?Sinusitis?J32.9  ?Patient will increase  water?and try to obtain?plenty of rest.? She will notify the office towards the end of the week if her symptoms worsen or fail to improve.  Ordered:  azelastine nasal, 2 spray(s), Nasal, BID, in each nostril, # 3 EA, 0 Refill(s), Pharmacy: [facility]  ?  2.?Peripheral edema?R60.9  ?CMP today  Ordered:  potassium chloride, 10 mEq = 1 tab(s), Oral, Daily, # 90 tab(s), 0 Refill(s), Pharmacy: [facility]  Comprehensive Metabolic Panel, Routine collect, 02/25/19 12:13:00 CST, Blood, Stop date 02/25/19 12:13:00 CST, Lab Collect, Peripheral edema, 02/25/19 12:13:00 CST  ?  AR (allergic rhinitis)?J30.9  Ordered:  azelastine nasal, 2 spray(s), Nasal, BID, in each nostril, # 3 EA, 0 Refill(s), Pharmacy: [facility]  ?  Bronchospasm?J98.01  Ordered:  albuterol, 2 puff(s), INH, q4hr, PRN PRN for wheezing, # 3 EA, 0 Refill(s), Pharmacy: [facility]  ?  Cough?R05  Ordered:  azelastine nasal, 2 spray(s), Nasal, BID, in each nostril, # 3 EA, 0 Refill(s), Pharmacy: [facility]  ?  DM - Diabetes mellitus?E11.9  Ordered:  metFORMIN, 500 mg = 1 tab(s), Oral, q24hr, # 90 tab(s), 0 Refill(s), Pharmacy: [facility]  ?  Hypercholesteremia?E78.00  Ordered:  rosuvastatin, 10 mg = 1 tab(s), Oral, Daily, # 90 tab(s), 0 Refill(s), Pharmacy: [facility]  ?  Hypertension?I10  Ordered:  amlodipine-valsartan, 1 tab(s), Oral, Daily, # 90 tab(s), 0 Refill(s), Pharmacy: [facility]  ?  Hypothyroidism?E03.9  Ordered:  levothyroxine, 112 mcg = 1 tab(s), Oral, Daily, # 90 tab(s), 0 Refill(s), Pharmacy: [facility]  ?  Insomnia?G47.00  Ordered:  diazepam, 5 mg = 1 tab(s), Oral, qPM, PRN PRN sleep, # 90 tab(s), 0 Refill(s), Pharmacy: [facility]  ?  OA (osteoarthritis)?M19.90  Ordered:  predniSONE, 5 mg = 1 tab(s), Oral, BID, # 180 tab(s),  0 Refill(s), Pharmacy: [facility]  traMADol, 50 mg = 1 tab(s), Oral, BID, PRN PRN as needed for pain, # 180 tab(s), 0 Refill(s), Pharmacy: [facility]  ?   Problem List/Past Medical History  Ongoing  Anxiety  Arthritis  Bronchospasm  Cataracts  Cough  Diverticulitis  DM - Diabetes mellitus  Fibromyalgia  Hearing loss  High cholesterol  Hypertension  Hypothyroidism  Obesity  Osteoarthritis of right shoulder  Osteoporosis  Partial tear of right rotator cuff  Peripheral edema  Sinusitis  sleep apnea  Strain of tendon of right rotator cuff  Wears glasses  Historical  Pneumonia  Procedure/Surgical History  32141 - RT CATARACT W/IOL 1 STA PHACO (Right) (10/10/2017)  Hernia repair ()  Tonsillectomy (1948)   delivery  Cholecystectomy  Excision of cyst of ovary   Medications  Astelin 137 mcg/inh nasal spray, 2 spray(s), Nasal, BID  Caltrate 600 with D  Carafate 1 gram tablet, 1 gm= 1 tab(s), Oral, QID  Crestor 10 mg oral tablet, 10 mg= 1 tab(s), Oral, Daily  Delsym, 30 mg, Oral, q12hr  DIAZepam 5 mg oral tablet, 5 mg= 1 tab(s), Oral, qPM, PRN  Exforge 10 mg-320 mg oral tablet, 1 tab(s), Oral, Daily  Flonase 0.05 mg/inh nasal spray, See Instructions,? ?Not taking  fluocinonide 0.05% topical solution, 1 annetta, TOP, BID, 1 refills,? ?Not taking  Klor-Con 10 oral tablet, extended release, 10 mEq= 1 tab(s), Oral, Daily  Lasix 20 mg oral tablet, 20 mg= 1 tab(s), Oral, Daily, 1 refills  levothyroxine 112 mcg (0.112 mg) oral tablet, 112 mcg= 1 tab(s), Oral, Daily  metformin 500 mg oral tablet, 500 mg= 1 tab(s), Oral, q24hr  MVI with Minerals (Adult Tab), Oral, Daily  prednisONE 5 mg oral tablet, 5 mg= 1 tab(s), Oral, BID  Promethazine PLO Gel 50mg/ml, See Instructions  traMADol 50 mg oral tablet, 50 mg= 1 tab(s), Oral, BID, PRN  Ventolin HFA 90 mcg/inh inhalation aerosol, 2 puff(s), INH, q4hr, PRN  Allergies  sulfa drugs  Social History  Alcohol  Current, Wine, 1-2 times per  week, 12/21/2018  Employment/School  Retired, Work/School description: education level: college., 08/27/2013  Home/Environment  Lives with Spouse. Living situation: Home/Independent., 08/27/2013  Substance Abuse - Denies Substance Abuse, 08/27/2013  Never, 12/21/2018  Tobacco - Denies Tobacco Use, 08/27/2013  Never (less than 100 in lifetime), N/A, 02/25/2019  Family History  Acute myocardial infarction.: Mother.  Hypercholesterolaemia: Brother.  Hypertension.: Mother and Brother.  Liver cancer: Father and Brother.  Stroke: Brother (Dx at 45).  Immunizations  Vaccine Date Status Comments   influenza virus vaccine, inactivated 12/05/2018 Given    tetanus/diphth/pertuss (Tdap) adult/adol 09/13/2017 Recorded    influenza virus vaccine, inactivated 09/13/2017 Recorded [2/16/2018] HIGH DOSE   pneumococcal 13-valent conjugate vaccine 10/06/2016 Recorded AFP   anti-thymocyte globulin (rabbit) 10/06/2016 Recorded AFP   Health Maintenance  Health Maintenance  ???Pending?(in the next year)  ??? ??OverDue  ??? ? ? ?Pneumococcal Vaccine due??and every?  ??? ??Due?  ??? ? ? ?ADL Screening due??02/25/19??and every 1??year(s)  ??? ? ? ?Aspirin Therapy for CVD Prevention due??02/25/19??and every 1??year(s)  ??? ? ? ?Bone Density Screening due??02/25/19??Variable frequency  ??? ? ? ?Cognitive Screening due??02/25/19??and every 1??year(s)  ??? ? ? ?Diabetes Maintenance-Foot Exam due??02/25/19??and every?  ??? ? ? ?Functional Assessment due??02/25/19??and every 1??year(s)  ??? ? ? ?Geriatric Depression Screening due??02/25/19??and every 1??year(s)  ??? ? ? ?Hypertension Management-Education due??02/25/19??and every 1??year(s)  ??? ? ? ?Smoking Cessation due??02/25/19??Variable frequency  ??? ? ? ?Zoster Vaccine due??02/25/19??and every 100??year(s)  ??? ??Due In Future?  ??? ? ? ?Advance Directive not due until??07/12/19??and every 1??year(s)  ??? ? ? ?Diabetes Maintenance-Fasting Lipid Profile not due until??11/30/19??and every  1??year(s)  ??? ? ? ?Hypertension Management-BMP not due until??11/30/19??and every 1??year(s)  ??? ? ? ?Diabetes Maintenance-Microalbumin not due until??11/30/19??and every 1??year(s)  ??? ? ? ?Diabetes Maintenance-Serum Creatinine not due until??11/30/19??and every 1??year(s)  ??? ? ? ?Diabetes Maintenance-Urine Dipstick not due until??11/30/19??and every 1??year(s)  ??? ? ? ?Diabetes Maintenance-HgbA1c not due until??12/21/19??and every 1??year(s)  ??? ? ? ?Fall Risk Assessment not due until??12/21/19??and every 1??year(s)  ??? ? ? ?Diabetes Maintenance-Eye Exam not due until??01/07/20??and every 1??year(s)  ???Satisfied?(in the past 1 year)  ??? ??Satisfied?  ??? ? ? ?Advance Directive on??07/12/18.??Satisfied by Lizy Alves LPN  ??? ? ? ?Blood Pressure Screening on??02/25/19.??Satisfied by Shanique Adkins LPN  ??? ? ? ?Body Mass Index Check on??02/25/19.??Satisfied by Shanique Adkins LPN  ??? ? ? ?Breast Cancer Screening on??04/18/18.??Satisfied by Nicole Moise  ??? ? ? ?Depression Screening on??10/12/18.??Satisfied by Tacos Cline  ??? ? ? ?Diabetes Maintenance-Eye Exam on??01/07/19.??Satisfied by Christine Ramírez  ??? ? ? ?Diabetes Maintenance-Fasting Lipid Profile on??11/30/18.??Satisfied by Suresh Rodriguez  ??? ? ? ?Diabetes Maintenance-HgbA1c on??11/30/18.??Satisfied by Cheyenne Mendez  ??? ? ? ?Diabetes Maintenance-Microalbumin on??11/30/18.??Satisfied by Cheyenne Mendez  ??? ? ? ?Diabetes Maintenance-Serum Creatinine on??11/30/18.??Satisfied by Suresh Rodriguez  ??? ? ? ?Diabetes Maintenance-Urine Dipstick on??11/30/18.??Satisfied by Cheyenne Mendez  ??? ? ? ?Diabetes Screening on??11/30/18.??Satisfied by Suresh Rodriguez  ??? ? ? ?Fall Risk Assessment on??12/21/18.??Satisfied by Jj Hawthorne  ??? ? ? ?Hypertension Management-Blood Pressure on??02/25/19.??Satisfied by Shanique Adkins LPN  ??? ? ? ?Hypertension Management-BMP on??11/30/18.??Satisfied by Suresh Rodriguez  ??? ? ? ?Influenza Vaccine  on??02/25/19.??Satisfied by Shanique Adkins LPN  ??? ? ? ?Lipid Screening on??11/30/18.??Satisfied by Suresh Rodriguez  ??? ? ? ?Obesity Screening on??02/25/19.??Satisfied by Shanique Adkins LPN  ?  ?

## 2022-05-02 NOTE — HISTORICAL [facility] CERNER
This is a historical note converted from Brijesh. Formatting and pictures may have been removed.  Please reference Brijesh for original formatting and attached multimedia. Chief Complaint  6 month F/U  History of Present Illness  Patient here for her 6 month follow up. Having pain in LLQ for 2-3 days. No diarrhea or fever. Has had diverticulitis several times and feels this is similar. Improved somewhat today.   needs around the clock care recently.  Review of Systems  General:??????????????? Patient reports energy level is good. Denies weight change. ?Denies fever,chills, night sweats, fatigue or weakness.  HEENT:?????????????????? Denies vision changes or eye pain.? No sore throat, ear pain, sinus pressure or discharge.  Cardiovascular:??? Denies chest pain, palpitations, dyspnea on exertion, orthopnea.  Respiratory:???????? No cough, wheezing, shortness of breath, or sputum.  GI:?????????????????????????See HPI  :??????????????????????? No frequency, urgency, hematuria, discharge, or incontinence  MS:?????????????????????? Denies myalgias, arthralgias, joint effusion, edema, or weakness  Neuro:????????????????? No headaches, numbness in extremities, tingling, dizziness, or weakness  Psych:?????????????????? Denies anxiety, depression, suicidal or homicidal ideations, or irritability  Endo:??????????????????? Denies polyuria, polydipsia, polyphagia  Heme:????????????????? No abnormal bleeding or bruising. No lymph node enlargement or pain.  Physical Exam  Vitals & Measurements  HR:?64(Peripheral)? BP:?130/78?  HT:?121?cm? WT:?118.5?kg? BMI:?80.94?  General :?????Well-developed and? nourished, no apparent distress, alert and oriented ?4  HEENT:????? TMs and EACs within normal limits,?nasal mucosa?within normal limits?with no discharge,?oropharynx clear  Integument :????? Skin is intact with no erythema.? No pustules or vesicles.? No rash or scale. No Lymphadenopathy.? No urticaria.? No abnormal nevi  Neck  :?????Supple, no lymphadenopathy, no thyromegaly, no bruits, no jugular venous distention  Cardiovascular :?????? Regular rhythm and rate, no murmurs, radial and dorsal pedal pulses 2+ bilaterally  Respiratory :??????Lungs clear to auscultation bilaterally, no wheezes, no crackles, no rhonchi.? Good air movement  Abdomen :?????? ?NABS, soft, nontender, no hepatosplenomegaly, no masses, no guarding or rebound????????????????????????  Ext:??????? No muscle tenderness, joints WNL, FROM, negative SLR, no?CCE  Neuro :? ?????? CN II-XII intact, reflexes 2+ throughout, no motor sensory deficits, negative?cerebellar? tests  Heme :?????? No bruising or petechiae?  Back:??????? no CVAT  Assessment/Plan  1.?Diverticulitis?K57.92  ?We will prescribe metronidazole 500 mg every 8 hours for 7 days. ?Patient will call me if?abdominal symptoms?persist or worsen  2.?DM - Diabetes mellitus?E11.9  3.?Hypertension?I10  4.?High cholesterol?E78.00  5.?Hypothyroidism?E03.9  6.?Osteoarthritis of right shoulder?M19.011  Refilled her chronic medications for 6 months.  We will check A1c, CMP, lipids, CBC, FT3, FT4, TSH,?and microalbumin.  Referrals  Clinic Follow up, *Est. 12/09/19 8:15:00 CST, CPX in 6 months, Order for future visit, Osteoarthritis of right shoulder, [facility] AFP   Problem List/Past Medical History  Ongoing  Anxiety  Arthritis  Bronchospasm  Cataracts  Cough  Diverticulitis  DM - Diabetes mellitus  Fibromyalgia  Hearing loss  High cholesterol  Hypertension  Hypothyroidism  Obesity  Osteoarthritis of right shoulder  Osteoporosis  Partial tear of right rotator cuff  Peripheral edema  Sinusitis  sleep apnea  Strain of tendon of right rotator cuff  Wears glasses  Historical  Pneumonia  Procedure/Surgical History  22892 - RT CATARACT W/IOL 1 STA PHACO (Right) (10/10/2017)  Extracapsular cataract removal with insertion of intraocular lens prosthesis (1 stage procedure), manual or mechanical technique (eg, irrigation and aspiration or  phacoemulsification) (10/10/2017)  Replacement of Right Lens with Synthetic Substitute, Percutaneous Approach (10/10/2017)  Hernia repair (2014)  Excision of lesion of other soft tissue (2013)  Excision, tumor, soft tissue of shoulder area, subcutaneous; 3 cm or greater. (2013)  Tonsillectomy (1948)   delivery  Cholecystectomy  Excision of cyst of ovary   Medications  Astelin 137 mcg/inh nasal spray, 2 spray(s), Nasal, BID, 1 refills  Caltrate 600 with D  Carafate 1 gram tablet, 1 gm= 1 tab(s), Oral, QID  Crestor 10 mg oral tablet, 10 mg= 1 tab(s), Oral, Daily, 1 refills  Delsym, 30 mg, Oral, q12hr  DIAZepam 5 mg oral tablet, 5 mg= 1 tab(s), Oral, qPM, PRN, 1 refills  Exforge 10 mg-320 mg oral tablet, 1 tab(s), Oral, Daily, 1 refills  Flagyl 500 mg oral tablet, 500 mg= 1 tab(s), Oral, q8hr  Flonase 50 mcg/inh nasal spray, See Instructions, 1 refills  fluocinonide 0.05% topical solution, 1 annetta, TOP, TID  Klor-Con 10 oral tablet, extended release, 10 mEq= 1 tab(s), Oral, Daily, 1 refills  Lasix 20 mg oral tablet, 20 mg= 1 tab(s), Oral, Daily, 1 refills  levothyroxine 112 mcg (0.112 mg) oral tablet, 112 mcg= 1 tab(s), Oral, Daily, 1 refills  metformin 500 mg oral tablet, 500 mg= 1 tab(s), Oral, q24hr, 1 refills  MVI with Minerals (Adult Tab), Oral, Daily  omeprazole 40 mg oral DR capsule, 40 mg= 1 cap(s), Oral, Daily  prednisONE 5 mg oral tablet, 5 mg= 1 tab(s), Oral, BID, 1 refills  Promethazine PLO Gel 50mg/ml, See Instructions  tiZANidine 4 mg oral tablet, See Instructions  traMADol 50 mg oral tablet, 50 mg= 1 tab(s), Oral, BID, PRN  Ventolin HFA 90 mcg/inh inhalation aerosol, 2 puff(s), INH, q4hr, PRN, 1 refills  Allergies  sulfa drugs  Social History  Abuse/Neglect  No, 2019  Alcohol  Current, Wine, 1-2 times per week, 2018  Employment/School  Retired, Work/School description: education level: college., 2013  Home/Environment  Lives with Spouse. Living situation:  Home/Independent., 08/27/2013  Substance Use - Denies Substance Abuse, 08/27/2013  Never, 12/21/2018  Tobacco - Denies Tobacco Use, 08/27/2013  Never (less than 100 in lifetime), N/A, 06/19/2019  Never (less than 100 in lifetime), N/A, 06/06/2019  Never (less than 100 in lifetime), N/A, 03/01/2019  Never (less than 100 in lifetime), N/A, 02/25/2019  Family History  Acute myocardial infarction.: Mother.  Hypercholesterolaemia: Brother.  Hypertension.: Mother and Brother.  Liver cancer: Father and Brother.  Stroke: Brother (Dx at 45).  Sister: History is negative  Immunizations  Vaccine Date Status Comments   influenza virus vaccine, inactivated 12/05/2018 Given    tetanus/diphth/pertuss (Tdap) adult/adol 09/13/2017 Recorded    influenza virus vaccine, inactivated 09/13/2017 Recorded [2/16/2018] HIGH DOSE   pneumococcal 13-valent conjugate vaccine 10/06/2016 Recorded AFP   anti-thymocyte globulin (rabbit) 10/06/2016 Recorded AFP   Health Maintenance  Health Maintenance  ???Pending?(in the next year)  ??? ??OverDue  ??? ? ? ?Pneumococcal Vaccine due??and every?  ??? ? ? ?Advance Directive due??01/01/19??and every 1??year(s)  ??? ? ? ?Cognitive Screening due??01/01/19??and every 1??year(s)  ??? ? ? ?Fall Risk Assessment due??01/01/19??and every 1??year(s)  ??? ? ? ?Functional Assessment due??01/01/19??and every 1??year(s)  ??? ? ? ?Geriatric Depression Screening due??01/01/19??and every 1??year(s)  ??? ??Due?  ??? ? ? ?ADL Screening due??06/23/19??and every 1??year(s)  ??? ? ? ?Aspirin Therapy for CVD Prevention due??06/23/19??and every 1??year(s)  ??? ? ? ?Bone Density Screening due??06/23/19??Variable frequency  ??? ? ? ?Diabetes Maintenance-Foot Exam due??06/23/19??and every?  ??? ? ? ?Hypertension Management-Education due??06/23/19??and every 1??year(s)  ??? ? ? ?Zoster Vaccine due??06/23/19??and every 100??year(s)  ??? ??Due In Future?  ??? ? ? ?Diabetes Maintenance-Urine Dipstick not due until??11/30/19??and every  1??year(s)  ??? ? ? ?Obesity Screening not due until??01/01/20??and every 1??year(s)  ??? ? ? ?Diabetes Maintenance-Eye Exam not due until??01/07/20??and every 1??year(s)  ??? ? ? ?Diabetes Maintenance-HgbA1c not due until??06/18/20??and every 1??year(s)  ??? ? ? ?Diabetes Maintenance-Fasting Lipid Profile not due until??06/18/20??and every 1??year(s)  ??? ? ? ?Hypertension Management-Blood Pressure not due until??06/18/20??and every 1??year(s)  ??? ? ? ?Hypertension Management-BMP not due until??06/18/20??and every 1??year(s)  ??? ? ? ?Diabetes Maintenance-Microalbumin not due until??06/19/20??and every 1??year(s)  ??? ? ? ?Diabetes Maintenance-Serum Creatinine not due until??06/19/20??and every 1??year(s)  ???Satisfied?(in the past 1 year)  ??? ??Satisfied?  ??? ? ? ?Advance Directive on??07/12/18.??Satisfied by Lizy Alves LPN  ??? ? ? ?Blood Pressure Screening on??06/19/19.??Satisfied by Lisa Blake CMA  ??? ? ? ?Body Mass Index Check on??06/19/19.??Satisfied by Lisa Blake CMA  ??? ? ? ?Depression Screening on??10/12/18.??Satisfied by Tacos Cline  ??? ? ? ?Diabetes Maintenance-Microalbumin on??06/19/19.??Satisfied by Suresh Rodriguez  ??? ? ? ?Diabetes Screening on??06/19/19.??Satisfied by Tanya Lai  ??? ? ? ?Fall Risk Assessment on??12/21/18.??Satisfied by Jj Hawthorne  ??? ? ? ?Hypertension Management-BMP on??06/19/19.??Satisfied by Cheyenne Mendez  ??? ? ? ?Influenza Vaccine on??02/25/19.??Satisfied by Shanique Adkins LPN  ??? ? ? ?Lipid Screening on??06/19/19.??Satisfied by Cheyenne Mendez  ??? ? ? ?Obesity Screening on??06/19/19.??Satisfied by Lisa Blake CMA  ?

## 2022-05-02 NOTE — HISTORICAL [facility]ERNER
This is a historical note converted from Brijesh. Formatting and pictures may have been removed.  Please reference Brijesh for original formatting and attached multimedia. Chief Complaint  6 WK RECHECK ANXIETY  History of Present Illness  Patient was last seen on 5/11/2021. ?She returns for?a 6-week follow-up?of anxiety.? Presents with her daughter.  Taking Buspirone 1 tab TID. Has developed tremor in hands when writing.  Also started Cymbalta last visit, has not helped her knee pain.  Taking Diazepam at night for sleep.  Saw Dr Lynn in March, appointment scheduled for September.  Sees Dr Chaudhry on 6-30-21.  Review of Systems  General:????????????Takes furosemide at night..??  HEENT:?????????????????? Denies vision changes or eye pain.? No sore throat, ear pain, sinus pressure or discharge.  Cardiovascular:??? Denies chest pain, palpitations, dyspnea on exertion, orthopnea.  Respiratory:???????? Mild to moderate wheezing. ?She takes her albuterol at night.  GI:????????????????????????? Denies nausea, emesis, constipation, diarrhea, melena, hematochezia or abdominal pain  :??????????????????????? No frequency, urgency, hematuria, discharge, or incontinence  MS:?????????????????????? See HPI.  Neuro:????????????????? No headaches, numbness in extremities, tingling, dizziness, or weakness  Psych:?????????????????? See HPI. ?Denies suicidal or homicidal ideations, or irritability  Endo:??????????????????? Denies polyuria, polydipsia, polyphagia  Heme:????????????????? No abnormal bleeding or bruising. No lymph node enlargement or pain.  Physical Exam  Vitals & Measurements  HR:?100(Peripheral)? BP:?118/89?  HT:?155?cm? HT:?155.00?cm? WT:?116.7?kg? WT:?116.700?kg? BMI:?48.57?  General :?????Well-developed, obese white female in no apparent distress, alert and oriented ?4  HEENT:????? TMs and EACs within normal limits,?nasal mucosa within normal limits?with no discharge,?oropharynx clear  Integument :????? Skin is intact  with no erythema.? No pustules or vesicles.? No rash or scale. No Lymphadenopathy.? No urticaria.? No abnormal nevi  Neck :?????Supple, no lymphadenopathy, no thyromegaly, no bruits, no jugular venous distention  Cardiovascular :?????? Regular rhythm and rate, no murmurs, radial and dorsal pedal pulses 2+ bilaterally  Respiratory :??????Lungs clear to auscultation bilaterally, no wheezes, no crackles, no rhonchi.? Good air movement  Abdomen :?????? ?NABS, soft, nontender, no hepatosplenomegaly, no masses, no guarding or rebound????????????????????????  Ext:??????? Tender with range of motion of knee, no erythema or evidence of infection.? 1+ pretibial edema bilaterally  Neuro :? ?????? Very mild?tremor in hands,?otherwise?grossly intact.? No meningeal signs.  Heme :?????? No bruising or petechiae?  Back:??????? no CVAT  ?  XR right knee: Advanced OA  Assessment/Plan  1.?Anxiety?F41.9  ?Daughter would like to take something for depression. Will start fluoxetine 20mg. Continue Diazepam.  2.?Tremor?R25.1  ?We will follow. ?Advised to decrease her?caffeine intake.  3.?Osteoarthritis of right knee?M17.11  ?Increase Tramadol to every 6 hours. If this dose adequatelly controlls pain then I will issue?a new Rx prior to her running out.  4.?DM - Diabetes mellitus?E11.9  ?We will check an A1c and microalbumin.  5.?Bronchospasm?J98.01  ?Start montelukast 10mg daily.  6.?Hypertension?I10  ?Blood pressure is well controlled.? Advised to change furosemide?dosage to the morning.  7.?Hypothyroidism?E03.9  ?Thyroid?function tests in May were normal.  Referrals  Clinic Follow up, *Est. 08/16/21 9:00:00 CDT, Order for future visit, DM - Diabetes mellitus, [facility]  Clinic Follow-up PRN, 06/16/21 11:08:00 CDT, [facility], Future Order   Problem List/Past Medical History  Ongoing  Anxiety  Arthritis  Bronchospasm  Cataracts  Cough  Diverticulitis  DM - Diabetes mellitus  Fibromyalgia  Hearing loss  High  cholesterol  Hypertension  Hypothyroidism  Obesity  Osteoarthritis of AC (acromioclavicular) joint  Osteoarthritis of right shoulder  Osteoporosis  Partial tear of right rotator cuff  Peripheral edema  Sciatica  sleep apnea  Strain of tendon of right rotator cuff  Tear of right supraspinatus tendon  Historical  Pneumonia  Sinusitis  Wears glasses  Procedure/Surgical History  85177 - RT CATARACT W/IOL 1 STA PHACO (Right) (10/10/2017)  Extracapsular cataract removal with insertion of intraocular lens prosthesis (1 stage procedure), manual or mechanical technique (eg, irrigation and aspiration or phacoemulsification) (10/10/2017)  Replacement of Right Lens with Synthetic Substitute, Percutaneous Approach (10/10/2017)  Hernia repair ()  Excision of lesion of other soft tissue (2013)  Excision, tumor, soft tissue of shoulder area, subcutaneous; 3 cm or greater. (2013)  Tonsillectomy (194)   delivery  Cholecystectomy  Excision of cyst of ovary   Medications  Astelin 137 mcg/inh nasal spray, 2 spray(s), Nasal, BID, 1 refills  busPIRone 15 mg oral tablet, See Instructions, 1 refills  Caltrate 600 with D  clobetasol 0.05% topical solution, 1 annetta, TOP, BID  fluocinonide 0.05% topical solution, TOP, TID  fluoxetine 20 mg oral capsule, 20 mg= 1 cap(s), Oral, Daily, 1 refills  furosemide 20 mg oral tablet, See Instructions, 1 refills  levothyroxine 125 mcg (0.125 mg) oral tablet, See Instructions, 1 refills  metformin 500 mg oral tablet, See Instructions, 1 refills  montelukast 10 mg oral TABLET, 10 mg= 1 tab(s), Oral, Daily, 1 refills  omeprazole 40 mg oral DR capsule, 40 mg= 1 cap(s), Oral, Daily, 5 refills  prednisONE 5 mg oral tablet, See Instructions  rosuvastatin 10 mg oral tablet, See Instructions, 1 refills  traMADol 50 mg oral tablet, 50 mg= 1 tab(s), Oral, q6hr, PRN  Valium 5 mg oral tablet, See Instructions, PRN  valsartan 320 mg oral tablet, 320 mg= 1 tab(s), Oral, Daily, 1 refills  Ventolin  HFA 90 mcg/inh inhalation aerosol, 2 puff(s), INH, q4hr, PRN, 1 refills  Allergies  sulfa drugs  Social History  Abuse/Neglect  No, 06/16/2021  No, 05/11/2021  No, 11/03/2020  No, 06/30/2020  No, 04/08/2020  No, 01/15/2020  No, 10/21/2019  No, 06/19/2019  Alcohol  Current, Wine, 1-2 times per week, 12/21/2018  Employment/School  Retired, Work/School description: education level: college., 08/27/2013  Home/Environment  Lives with Spouse. Living situation: Home/Independent., 08/27/2013  Substance Use - Denies Substance Abuse, 08/27/2013  Never, 12/21/2018  Tobacco - Denies Tobacco Use, 08/27/2013  Never (less than 100 in lifetime), No, 06/16/2021  Never (less than 100 in lifetime), No, 05/11/2021  Never (less than 100 in lifetime), No, 11/03/2020  Never (less than 100 in lifetime), No, 06/30/2020  Never (less than 100 in lifetime), No, 04/08/2020  Never (less than 100 in lifetime), N/A, 01/15/2020  Never (less than 100 in lifetime), N/A, 10/21/2019  Never (less than 100 in lifetime), N/A, 06/19/2019  Never (less than 100 in lifetime), N/A, 06/06/2019  Never (less than 100 in lifetime), N/A, 03/01/2019  Never (less than 100 in lifetime), N/A, 02/25/2019  Family History  Acute myocardial infarction.: Mother.  Hypercholesterolaemia: Brother.  Hypertension.: Mother and Brother.  Liver cancer: Father and Brother.  Stroke: Brother (Dx at 45).  Sister: History is negative  Immunizations  Vaccine Date Status Comments   influenza virus vaccine, inactivated 11/03/2020 Given    pneumococcal 23-polyvalent vaccine 04/08/2020 Given    influenza virus vaccine, inactivated 10/21/2019 Given    influenza virus vaccine, inactivated 12/05/2018 Given    tetanus/diphth/pertuss (Tdap) adult/adol 09/13/2017 Recorded    influenza virus vaccine, inactivated 09/13/2017 Recorded [2/16/2018] HIGH DOSE   pneumococcal 13-valent conjugate vaccine 10/06/2016 Recorded [facility]   anti-thymocyte globulin (rabbit) 10/06/2016 Recorded [facility]   Health  Maintenance  Health Maintenance  ???Pending?(in the next year)  ??? ??OverDue  ??? ? ? ?Depression Screening due??10/12/19??and every 1??year(s)  ??? ? ? ?Diabetes Maintenance-Eye Exam due??01/07/20??and every 1??year(s)  ??? ? ? ?Advance Directive due??01/02/21??and every 1??year(s)  ??? ? ? ?Cognitive Screening due??01/02/21??and every 1??year(s)  ??? ? ? ?Fall Risk Assessment due??01/02/21??and every 1??year(s)  ??? ? ? ?Functional Assessment due??01/02/21??and every 1??year(s)  ??? ??Due?  ??? ? ? ?ADL Screening due??06/26/21??and every 1??year(s)  ??? ? ? ?Aspirin Therapy for CVD Prevention due??06/26/21??and every 1??year(s)  ??? ? ? ?Bone Density Screening due??06/26/21??Variable frequency  ??? ? ? ?Diabetes Maintenance-Foot Exam due??06/26/21??Unknown Frequency  ??? ? ? ?Hypertension Management-Education due??06/26/21??and every 1??year(s)  ??? ? ? ?Zoster Vaccine due??06/26/21??Unknown Frequency  ??? ??Due In Future?  ??? ? ? ?Influenza Vaccine not due until??10/01/21??and every 1??day(s)  ??? ? ? ?Obesity Screening not due until??01/01/22??and every 1??year(s)  ??? ? ? ?Hypertension Management-BMP not due until??05/11/22??and every 1??year(s)  ??? ? ? ?Medicare Annual Wellness Exam not due until??05/11/22??and every 1??year(s)  ??? ? ? ?Diabetes Maintenance-Fasting Lipid Profile not due until??05/11/22??and every 1??year(s)  ??? ? ? ?Diabetes Maintenance-Serum Creatinine not due until??05/11/22??and every 1??year(s)  ??? ? ? ?Blood Pressure Screening not due until??06/16/22??and every 1??year(s)  ??? ? ? ?Body Mass Index Check not due until??06/16/22??and every 1??year(s)  ??? ? ? ?Diabetes Maintenance-HgbA1c not due until??06/16/22??and every 1??year(s)  ??? ? ? ?Hypertension Management-Blood Pressure not due until??06/16/22??and every 1??year(s)  ???Satisfied?(in the past 1 year)  ??? ??Satisfied?  ??? ? ? ?Blood Pressure Screening on??06/16/21.??Satisfied by Tammi Coronado CMA  ??? ? ? ?Body Mass  Index Check on??06/16/21.??Satisfied by Tammi Corondao CMA S.  ??? ? ? ?Diabetes Maintenance-Fasting Lipid Profile on??05/11/21.??Satisfied by Suresh Rodriguez  ??? ? ? ?Diabetes Maintenance-HgbA1c on??06/16/21.??Satisfied by Suresh Rodriguez  ??? ? ? ?Diabetes Maintenance-Microalbumin on??06/16/21.??Satisfied by Suresh Rodriguez  ??? ? ? ?Diabetes Maintenance-Serum Creatinine on??05/11/21.??Satisfied by Suresh Rodriguez  ??? ? ? ?Diabetes Screening on??06/16/21.??Satisfied by Suresh Rodriguez  ??? ? ? ?Hypertension Management-BMP on??06/16/21.??Satisfied by Suresh Rodriguez  ??? ? ? ?Hypertension Management-Blood Pressure on??06/16/21.??Satisfied by Tammi Coronado CMA S.  ??? ? ? ?Influenza Vaccine on??11/03/20.??Satisfied by Tammi Coronado CMA S.  ??? ? ? ?Lipid Screening on??05/11/21.??Satisfied by Suresh Rodriguez  ??? ? ? ?Medicare Annual Wellness Exam on??05/11/21.??Satisfied by Cosme Estes ?JOHN PARKER  ??? ? ? ?Obesity Screening on??06/16/21.??Satisfied by Tammi Coronado CMA S.  ?

## 2022-05-02 NOTE — HISTORICAL [facility]
This is a historical note converted from Brijesh. Formatting and pictures may have been removed.  Please reference Brijesh for original formatting and attached multimedia. Chief Complaint  6 MTH RECHECK  History of Present Illness  Patient here for six-month follow-up.?    on 20, 58 yr anniversary would have been on 10/31/20 . Also lost a daughter this year in  from blood clot that developed after a fall, she had?Crohns and neuropathy.  Review of Systems  General:??????????????? Patient reports energy level is good.??Denies fever,chills, night sweats, fatigue or weakness.  HEENT:?????????????????? Denies vision changes or eye pain.? No sore throat, ear pain, sinus pressure or discharge.  Cardiovascular:??? Denies chest pain, palpitations, dyspnea on exertion, orthopnea.  Respiratory:????????uses albuterol about 3 times a week.  GI:????????????????????????? Denies nausea, emesis, constipation, diarrhea, melena, hematochezia or abdominal pain  :??????????????????????? No frequency, urgency, hematuria, discharge, or incontinence  MS:?????????????????????? Right knee pain worse lately. Getting shots from Dr Lynn.  swelling in feet, despite diuretic. Takes Lasix 20mg about 3 days a week due to urinary frequency.  Neuro:????????????????? No headaches, numbness in extremities, tingling, dizziness, or weakness  Psych:?????????????????? Denies anxiety, depression, suicidal or homicidal ideations, or irritability  Endo:??????????????????? Denies polyuria, polydipsia, polyphagia  Heme:????????????????? No abnormal bleeding or bruising. No lymph node enlargement or pain.  Physical Exam  Vitals & Measurements  HR:?76(Peripheral)? BP:?133/85?  HT:?155.00?cm? HT:?155?cm? WT:?122.200?kg? WT:?122.2?kg? BMI:?50.86?  General :?????Well-developed morbidly obese female in no apparent distress, alert and oriented ?4  Neck :?????Supple, no lymphadenopathy, no thyromegaly, no bruits, no jugular venous  distention  Cardiovascular :?????? Regular rhythm and rate, no murmurs, radial and dorsal pedal pulses 2+ bilaterally  Respiratory :??????Lungs clear to auscultation bilaterally, no wheezes, no crackles, no rhonchi.? Good air movement  Abdomen :?????? ?NABS, soft, nontender, no hepatosplenomegaly, no masses, no guarding or rebound????????????????????????  MS :??????? No muscle tenderness, joints WNL, FROM, negative SLR, 1-2+ pretibial edema  Neuro :? ?Grossly intact  Heme :?????? No bruising or petechiae?  Back:??????? no CVAT  Assessment/Plan  1.?Hypertension?I10  ?Stop amlodipine, continue valsartan 320mg. will increase Metoprolol to ER 50mg.  Continue furosemide 20mg prn. She will monitor BP and contact us if consistently greater than 140/90.  2.?DM - Diabetes mellitus?E11.9  ?We will check an A1c and microalbumin. ?Refill metformin 500 mg for 6 months.  3.?High cholesterol?E78.00  ?We will check a CMP and lipids  4.?Hypothyroidism?E03.9  ?We will check a TSH, FT3, and FT4.??Refilled?levothyroxine 125 mcg?for 6 months.  5.?Anxiety?F41.9  ?Diazepam makes her very sedated and doesnt help. We will try buspirone.  6.?Vitamin D deficiency?E55.9  We will check a?Vitamin D level.  7.?Arthritis?M19.90  8.?Bronchospasm?J98.01  ?Stable  9.?Immunization due?Z23  ?High-dose flu vaccine administered IM.  Referrals  Clinic Follow up, *Est. 05/11/21 8:45:00 CDT, CPX in 6 months, Order for future visit, Arthritis, [facility]   Problem List/Past Medical History  Ongoing  Anxiety  Arthritis  Bronchospasm  Cataracts  Cough  Diverticulitis  DM - Diabetes mellitus  Fibromyalgia  Hearing loss  High cholesterol  Hypertension  Hypothyroidism  Obesity  Osteoarthritis of AC (acromioclavicular) joint  Osteoarthritis of right shoulder  Osteoporosis  Partial tear of right rotator cuff  Peripheral edema  Sciatica  sleep apnea  Strain of tendon of right rotator cuff  Tear of right supraspinatus tendon  Historical  Pneumonia  Sinusitis  Wears  glasses  Procedure/Surgical History  85264 - RT CATARACT W/IOL 1 STA PHACO (Right) (10/10/2017)  Extracapsular cataract removal with insertion of intraocular lens prosthesis (1 stage procedure), manual or mechanical technique (eg, irrigation and aspiration or phacoemulsification) (10/10/2017)  Replacement of Right Lens with Synthetic Substitute, Percutaneous Approach (10/10/2017)  Hernia repair (2014)  Excision of lesion of other soft tissue (2013)  Excision, tumor, soft tissue of shoulder area, subcutaneous; 3 cm or greater. (2013)  Tonsillectomy (1948)   delivery  Cholecystectomy  Excision of cyst of ovary   Medications  Astelin 137 mcg/inh nasal spray, 2 spray(s), Nasal, BID, 1 refills  busPIRone 15 mg oral tablet, See Instructions, 5 refills  Caltrate 600 with D  clobetasol 0.05% topical solution, 1 annetta, TOP, BID  Lasix 20 mg oral tablet, 20 mg= 1 tab(s), Oral, Daily, 1 refills  levothyroxine 125 mcg (0.125 mg) oral tablet, 125 mcg= 1 tab(s), Oral, Daily, 1 refills  metformin 500 mg oral tablet, 500 mg= 1 tab(s), Oral, q24hr, 1 refills  omeprazole 40 mg oral DR capsule, 40 mg= 1 cap(s), Oral, Daily  prednisONE 5 mg oral tablet, 5 mg= 1 tab(s), Oral, BID, 1 refills  rosuvastatin 10 mg oral tablet, See Instructions, 1 refills  tiZANidine 4 mg oral tablet, See Instructions, 1 refills  traMADol 50 mg oral tablet, 50 mg= 1 tab(s), Oral, BID, PRN  Valium 5 mg oral tablet, See Instructions, PRN  valsartan 320 mg oral tablet, 320 mg= 1 tab(s), Oral, Daily, 1 refills  Ventolin HFA 90 mcg/inh inhalation aerosol, 2 puff(s), INH, q4hr, PRN, 1 refills  Vitamin D3 50,000 intl units (1250 mcg) oral capsule, 43911 IntUnit= 1 cap(s), Oral, QOWK, 1 refills  Allergies  sulfa drugs  Social History  Abuse/Neglect  No, 2020  No, 2020  No, 2020  No, 01/15/2020  No, 10/21/2019  No, 2019  Alcohol  Current, Wine, 1-2 times per week, 2018  Employment/School  Retired, Work/School  description: education level: college., 08/27/2013  Home/Environment  Lives with Spouse. Living situation: Home/Independent., 08/27/2013  Substance Use - Denies Substance Abuse, 08/27/2013  Never, 12/21/2018  Tobacco - Denies Tobacco Use, 08/27/2013  Never (less than 100 in lifetime), No, 11/03/2020  Never (less than 100 in lifetime), No, 06/30/2020  Never (less than 100 in lifetime), No, 04/08/2020  Never (less than 100 in lifetime), N/A, 01/15/2020  Never (less than 100 in lifetime), N/A, 10/21/2019  Never (less than 100 in lifetime), N/A, 06/19/2019  Never (less than 100 in lifetime), N/A, 06/06/2019  Never (less than 100 in lifetime), N/A, 03/01/2019  Never (less than 100 in lifetime), N/A, 02/25/2019  Family History  Acute myocardial infarction.: Mother.  Hypercholesterolaemia: Brother.  Hypertension.: Mother and Brother.  Liver cancer: Father and Brother.  Stroke: Brother (Dx at 45).  Sister: History is negative  Immunizations  Vaccine Date Status Comments   influenza virus vaccine, inactivated 11/03/2020 Given    pneumococcal 23-polyvalent vaccine 04/08/2020 Given    influenza virus vaccine, inactivated 10/21/2019 Given    influenza virus vaccine, inactivated 12/05/2018 Given    tetanus/diphth/pertuss (Tdap) adult/adol 09/13/2017 Recorded    influenza virus vaccine, inactivated 09/13/2017 Recorded [2/16/2018] HIGH DOSE   pneumococcal 13-valent conjugate vaccine 10/06/2016 Recorded [facility]   anti-thymocyte globulin (rabbit) 10/06/2016 Recorded [facility]   Health Maintenance  Health Maintenance  ???Pending?(in the next year)  ??? ??OverDue  ??? ? ? ?Depression Screening due??10/12/19??and every 1??year(s)  ??? ? ? ?Advance Directive due??01/02/20??and every 1??year(s)  ??? ? ? ?Cognitive Screening due??01/02/20??and every 1??year(s)  ??? ? ? ?Fall Risk Assessment due??01/02/20??and every 1??year(s)  ??? ? ? ?Functional Assessment due??01/02/20??and every 1??year(s)  ??? ??Due?  ??? ? ? ?ADL Screening due??11/08/20??and  every 1??year(s)  ??? ? ? ?Aspirin Therapy for CVD Prevention due??11/08/20??and every 1??year(s)  ??? ? ? ?Bone Density Screening due??11/08/20??Variable frequency  ??? ? ? ?Diabetes Maintenance-Eye Exam due??11/08/20??Unknown Frequency  ??? ? ? ?Diabetes Maintenance-Foot Exam due??11/08/20??Unknown Frequency  ??? ? ? ?Hypertension Management-Education due??11/08/20??and every 1??year(s)  ??? ? ? ?Zoster Vaccine due??11/08/20??Unknown Frequency  ??? ??Due In Future?  ??? ? ? ?Obesity Screening not due until??01/01/21??and every 1??year(s)  ??? ? ? ?Medicare Annual Wellness Exam not due until??04/08/21??and every 1??year(s)  ??? ? ? ?Influenza Vaccine not due until??10/01/21??and every 1??day(s)  ??? ? ? ?Blood Pressure Screening not due until??11/03/21??and every 1??year(s)  ??? ? ? ?Body Mass Index Check not due until??11/03/21??and every 1??year(s)  ??? ? ? ?Diabetes Maintenance-HgbA1c not due until??11/03/21??and every 1??year(s)  ??? ? ? ?Hypertension Management-BMP not due until??11/03/21??and every 1??year(s)  ??? ? ? ?Hypertension Management-Blood Pressure not due until??11/03/21??and every 1??year(s)  ??? ? ? ?Diabetes Maintenance-Fasting Lipid Profile not due until??11/03/21??and every 1??year(s)  ??? ? ? ?Diabetes Maintenance-Serum Creatinine not due until??11/03/21??and every 1??year(s)  ???Satisfied?(in the past 1 year)  ??? ??Satisfied?  ??? ? ? ?Blood Pressure Screening on??11/03/20.??Satisfied by Tammi Coronado CMA.  ??? ? ? ?Body Mass Index Check on??11/03/20.??Satisfied by Tammi Coronado CMA.  ??? ? ? ?Diabetes Maintenance-Fasting Lipid Profile on??11/03/20.??Satisfied by Suresh Rodriguez  ??? ? ? ?Diabetes Maintenance-HgbA1c on??11/03/20.??Satisfied by Cheyenne Mendez  ??? ? ? ?Diabetes Maintenance-Microalbumin on??11/03/20.??Satisfied by Tanya Lai  ??? ? ? ?Diabetes Maintenance-Serum Creatinine on??11/03/20.??Satisfied by Suresh Rodriguez  ??? ? ? ?Diabetes Screening  on??11/03/20.??Satisfied by Suresh Rodriguez  ??? ? ? ?Hypertension Management-BMP on??11/03/20.??Satisfied by Tanya Lai  ??? ? ? ?Hypertension Management-Blood Pressure on??11/03/20.??Satisfied by Tammi Coronado CMA.  ??? ? ? ?Influenza Vaccine on??11/03/20.??Satisfied by Tammi Coronado CMA  ??? ? ? ?Lipid Screening on??11/03/20.??Satisfied by Suresh Rodriguez  ??? ? ? ?Medicare Annual Wellness Exam on??04/08/20.??Satisfied by Cosme Estes ?JOHN PARKER  ??? ? ? ?Obesity Screening on??11/03/20.??Satisfied by Tammi Coronado CMA.  ??? ? ? ?Pneumococcal Vaccine on??04/08/20.??Satisfied by Raúl Sousa  ?

## 2022-06-01 PROBLEM — M81.0 OSTEOPOROSIS: Status: ACTIVE | Noted: 2022-06-01

## 2022-06-01 PROBLEM — E55.9 VITAMIN D DEFICIENCY: Status: ACTIVE | Noted: 2022-06-01

## 2022-06-01 PROBLEM — R60.0 PERIPHERAL EDEMA: Status: ACTIVE | Noted: 2022-06-01

## 2022-06-01 PROBLEM — M54.30 SCIATICA: Status: ACTIVE | Noted: 2022-06-01

## 2022-06-01 PROBLEM — G47.30 SLEEP APNEA: Status: ACTIVE | Noted: 2022-06-01

## 2022-06-01 PROBLEM — N18.31 CHRONIC KIDNEY DISEASE, STAGE 3A: Status: ACTIVE | Noted: 2022-06-01

## 2022-06-01 PROBLEM — E78.00 HYPERCHOLESTEROLEMIA: Status: ACTIVE | Noted: 2022-06-01

## 2022-06-01 PROBLEM — E11.9 DIABETES MELLITUS: Status: ACTIVE | Noted: 2022-06-01

## 2022-06-01 PROBLEM — E03.9 HYPOTHYROIDISM: Status: ACTIVE | Noted: 2022-06-01

## 2022-06-01 PROBLEM — I10 HYPERTENSION: Status: ACTIVE | Noted: 2022-06-01

## 2022-06-01 PROBLEM — E66.9 OBESITY: Status: ACTIVE | Noted: 2022-06-01

## 2022-06-01 PROBLEM — J44.9 CHRONIC OBSTRUCTIVE PULMONARY DISEASE, UNSPECIFIED COPD TYPE: Status: ACTIVE | Noted: 2022-06-01

## 2022-06-01 PROBLEM — M19.011 OSTEOARTHRITIS OF RIGHT SHOULDER: Status: ACTIVE | Noted: 2022-06-01

## 2022-10-18 PROBLEM — K57.92 DIVERTICULITIS: Status: ACTIVE | Noted: 2022-10-18

## 2022-10-18 PROBLEM — K57.92 DIVERTICULITIS: Status: RESOLVED | Noted: 2022-10-18 | Resolved: 2022-10-18

## 2022-10-18 PROBLEM — F41.9 ANXIETY: Status: ACTIVE | Noted: 2022-10-18

## 2022-10-18 PROBLEM — M19.019 OSTEOARTHRITIS OF ACROMIOCLAVICULAR JOINT: Status: ACTIVE | Noted: 2022-06-01

## 2022-10-18 PROBLEM — H91.90 HEARING LOSS: Status: ACTIVE | Noted: 2022-10-18

## 2022-10-18 PROBLEM — M79.7 FIBROMYALGIA: Status: ACTIVE | Noted: 2022-10-18

## 2022-10-18 PROBLEM — H26.9 CATARACT OF BOTH EYES: Status: ACTIVE | Noted: 2022-10-18

## 2023-06-02 PROBLEM — K21.9 GASTROESOPHAGEAL REFLUX DISEASE: Status: ACTIVE | Noted: 2023-06-02

## 2024-01-05 PROBLEM — M19.90 OA (OSTEOARTHRITIS): Status: ACTIVE | Noted: 2024-01-05
